# Patient Record
(demographics unavailable — no encounter records)

---

## 2025-02-21 NOTE — ASSESSMENT
[FreeTextEntry1] :  # Seborrheic dermatitis - chronic, mild, flaring - diagnosis reviewed - START ketoconazole 2% shampoo apply to scalp 3x/week and alternate with regular shampoo - START fluocinolone 0.01% solution to itchy spots on scalp twice a day PRN itchiness for up to 2 weeks, do not get on face  # Sun sensitivity - continue with mineral sunscreen - recommend UPF clothing  # Benign nevi - Benign, no treatment warranted  RTC in summer

## 2025-02-21 NOTE — HISTORY OF PRESENT ILLNESS
[FreeTextEntry1] : NP itchy scalp [de-identified] : 02/21/2025 04:16 PM CHASITY RUFFIN is a 17 year F presenting today for evaluation of the following:  # itchy, burning scalp with dandruff  # gets itchy on the back and arms in the summer with the sun, uses blue lizard sunscreen  # TBSE - history of beta thalassemia s/p bone marrow transplant - wants waist up only exam

## 2025-02-21 NOTE — PHYSICAL EXAM
[FreeTextEntry3] : WAIST UP EXAM ONLY scale in the scalp scattered brown macules on the torso and arms

## 2025-06-06 NOTE — PHYSICAL EXAM
[FreeTextEntry3] : General: well appearing person in nad, alert, pleasant Focused Skin Exam per patient preference: scalp with scale back with scaly eczematous papules and thin plaques xerosis

## 2025-06-06 NOTE — HISTORY OF PRESENT ILLNESS
[FreeTextEntry1] : RPV- itch [de-identified] : Jun 6 2025 11:15AM 18yoF with hx of bone marrow transplant for beta thalessemia 7 years ago, here for f/u of:  # itchy, burning scalp with dandruff- used keto shampoo and and fluocinolone, helped somewhat.  # gets itchy on the back and arms in the summer with the sun- always wears shirt, blue lizard sunscreen.

## 2025-06-06 NOTE — ASSESSMENT
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 Proper medication use and side effects discussed, including cutaneous atrophy and striae. Avoid long-term use; do not use on face, axillae, groin. - use sunprotection in case component of PMLE- wears a shirt  RTC 8 weeks